# Patient Record
Sex: MALE | Race: ASIAN
[De-identification: names, ages, dates, MRNs, and addresses within clinical notes are randomized per-mention and may not be internally consistent; named-entity substitution may affect disease eponyms.]

---

## 2018-04-26 ENCOUNTER — HOSPITAL ENCOUNTER (EMERGENCY)
Dept: HOSPITAL 80 - FED | Age: 33
Discharge: HOME | End: 2018-04-26
Payer: COMMERCIAL

## 2018-04-26 VITALS — SYSTOLIC BLOOD PRESSURE: 121 MMHG | DIASTOLIC BLOOD PRESSURE: 71 MMHG

## 2018-04-26 DIAGNOSIS — R10.10: Primary | ICD-10-CM

## 2018-04-26 DIAGNOSIS — E86.9: ICD-10-CM

## 2018-04-26 LAB — PLATELET # BLD: 216 10^3/UL (ref 150–400)

## 2018-04-26 NOTE — EDPHY
HPI/HX/ROS/PE/MDM


Narrative: 


CHIEF COMPLAINT: Mid-abdominal pain





HISTORY OF PRESENT ILLNESS:


The patient is a 31 y/o male complaining of cramping, waxing and waning, 

worsening mid-abdominal pain onset this morning after eating pasta and milk for 

breakfast. Two weeks ago he had a similar pain in the same location, that 

lasted all night, but eventually went away on it's own. He thought having a 

bowel movement might help his symptoms, but this had no effect on them. He also 

believes vomiting might make his symptoms better, but he denies nausea. Prior 

episodes of excessive flatulence did not cause pains like today. Denies history 

of prior abdominal surgeries, GERD, gallbladder problems. Denies tobacco, 

alcohol or marijuana use.





No fever, chills, chest pain, shortness of breath, palpitations, vomiting, 

diarrhea, urinary complaints, headache, lightheadedness. 





REVIEW OF SYSTEMS:


Aside from elements discussed in the HPI, a comprehensive 10-point review of 

systems was reviewed and is negative.





PAST MEDICAL HISTORY:  Denies





SOCIAL HISTORY: , lives in [facility] 





VITAL SIGNS: Reviewed by me


GENERAL: Well-developed, well-nourished, resting comfortably in no respiratory 

distress.


HEENT: Atraumatic. Eyes: No icterus, no injection. Mouth: moist mucous 

membranes.  No erythema or lesions. Neck: supple with no adenopathy.


LUNGS: Clear to auscultation bilaterally, no wheezes, rhonchi or rales.


CARDIAC: Regular rate and rhythm, no rubs, murmurs or gallops.


ABDOMEN: Mid-abdominal, waxing and waning pain between xiphoid and umbilicus. 

Soft, nondistended, bowel sounds normal.


BACK:  No CVA tenderness.


EXTREMITIES: No trauma. No edema.  Range of motion is normal throughout.


NEURO: Alert and oriented,  grossly nonfocal.  


SKIN: Warm and dry, no rash.


PSYCHIATRIC: Normal mentation, no agitation.





Portions of this note were transcribed by a medical scribe.  I personally 

performed a history, physical exam, medical decision making, and confirmed 

accuracy of information the transcribed note.





ED Course: 


The patient is a 31 y/o male presenting with cramping, waxing and waning, 

worsening mid-abdominal pain onset this morning after eating pasta and milk for 

breakfast. On exam he has mid-abdominal, waxing and waning pain between his 

xiphoid and umbilicus. He does not have abdominal tenderness to palpation. Labs 

ordered. 1L IV NS and GI cocktail given.





3:10 p.m.:  Patient was reexamined.  His seen minimal relief with the fluids 

and GI cocktail.  Pain is coming in waves.  CT scan abdomen pelvis ordered.  

Patient also reports that he takes his simvastatin for high triglycerides.  

Patient's lipase is normal but will evaluate his pancreas on the CT scan.





Toradol and fentanyl administered.





1553: Spoke with radiologist, patient has a normal abdominal CT.





1620: Patient is feeling better after Toradol and Fentanyl. I have prescribed 

him Bentyl and advised him to take Prilosec. Return precautions provided; 

patient is comfortable with this plan.





MDM: 





After obtaining the patients history and performing an examination, 

differential diagnosis considered included but was not limited to GERD, 

gastritis, appendicitis, cholecystitis, gastritis, pancreatitis, ileus, 

gastroenteritis, kidney stones, urinary tract infections and other causes.





- Data Points


Imaging Results: 


Impression: 1. Normal CT abdomen and pelvis, with contrast enhancement. 2. No 

CT evidence of appendicitis, abscess, or bowel obstruction. Findings and 

recommendations discussed with Emergency Department physician, Cammie Parks M.D., at 1553 hours, on April 26, 2018. Final report concurs with initial 

preliminary interpretation. Dictated By: Marvin Whitehead 


Imaging: Discussed imaging studies w/ On call Radiologist, I viewed and 

interpreted images myself


Laboratory Results: 


 Laboratory Results





 04/26/18 13:04 





 04/26/18 13:04 








Medications Given: 


 








Discontinued Medications





Al Hydroxide/Mg Hydroxide (Maalox Susp)  30 ml PO ONCE ONE


   Stop: 04/26/18 13:21


   Last Admin: 04/26/18 13:26 Dose:  30 ml


Fentanyl (Sublimaze)  50 mcg IVP EDNOW ONE


   Stop: 04/26/18 15:11


   Last Admin: 04/26/18 15:15 Dose:  50 mcg


Hyoscyamine Sulfate (Levsin, Hyomax-Sl)  0.25 mg PO ONCE ONE


   Stop: 04/26/18 13:21


   Last Admin: 04/26/18 13:26 Dose:  0.25 mg


Sodium Chloride (Ns)  1,000 mls @ 0 mls/hr IV EDNOW ONE; Wide Open


   PRN Reason: Protocol


   Stop: 04/26/18 13:21


   Last Admin: 04/26/18 13:27 Dose:  1,000 mls


Ketorolac Tromethamine (Toradol)  15 mg IVP EDNOW ONE


   Stop: 04/26/18 15:11


   Last Admin: 04/26/18 15:15 Dose:  15 mg


Lidocaine (Lidocaine 2% Viscous)  15 ml PO ONCE ONE


   Stop: 04/26/18 13:21


   Last Admin: 04/26/18 13:26 Dose:  15 ml








General


Time Seen by Provider: 04/26/18 12:57


Initial Vital Signs: 


 Initial Vital Signs











Temperature (C)  36.4 C   04/26/18 12:27


 


Heart Rate  55 L  04/26/18 12:27


 


Respiratory Rate  16   04/26/18 12:27


 


Blood Pressure  118/69   04/26/18 12:27


 


O2 Sat (%)  100   04/26/18 12:27








 











O2 Delivery Mode               Room Air














Allergies/Adverse Reactions: 


 





No Known Allergies Allergy (Unverified 04/26/18 12:26)


 








Home Medications: 














 Medication  Instructions  Recorded


 


Dicyclomine [Bentyl 20 MG (*)] 20 mg PO QID #20 tab 04/26/18


 


Omeprazole 20 mg PO DAILY #30 capsule. 04/26/18


 


Simvastatin  04/26/18














Departure





- Departure


Disposition: Home, Routine, Self-Care


Clinical Impression: 


Abdominal pain


Qualifiers:


 Abdominal location: upper abdomen, unspecified Qualified Code(s): R10.10 - 

Upper abdominal pain, unspecified





Condition: Good


Instructions:  Acute Abdominal Pain (ED)


Additional Instructions: 


Start taking Prilosec, you can buy this over-the-counter, but I have provided 

you a prescription.





Take Bentyl as prescribed.





Follow up with your primary care provider for worsening symptoms, or if you 

develop fever or vomiting.





Return to the Emergency Department for fever, chest pain, shortness of breath, 

increasing pain, or other worsening of condition.





Referrals: 


Margo Olmos MD [Primary Care Provider] - As per Instructions


Prescriptions: 


Dicyclomine [Bentyl 20 MG (*)] 20 mg PO QID #20 tab


Omeprazole 20 mg PO DAILY #30 capsule.


Report Scribed for: Cammie Parks


Report Scribed by: Susy Walton


Date of Report: 04/26/18


Time of Report: 12:57